# Patient Record
Sex: MALE | Race: WHITE | Employment: OTHER | URBAN - METROPOLITAN AREA
[De-identification: names, ages, dates, MRNs, and addresses within clinical notes are randomized per-mention and may not be internally consistent; named-entity substitution may affect disease eponyms.]

---

## 2017-04-20 ENCOUNTER — OFFICE VISIT (OUTPATIENT)
Dept: DERMATOLOGY | Age: 82
End: 2017-04-20

## 2017-04-20 DIAGNOSIS — L57.0 AK (ACTINIC KERATOSIS): Primary | ICD-10-CM

## 2017-04-20 PROCEDURE — 1036F TOBACCO NON-USER: CPT | Performed by: DERMATOLOGY

## 2017-04-20 PROCEDURE — 17000 DESTRUCT PREMALG LESION: CPT | Performed by: DERMATOLOGY

## 2017-04-20 PROCEDURE — 17003 DESTRUCT PREMALG LES 2-14: CPT | Performed by: DERMATOLOGY

## 2017-07-10 ENCOUNTER — TELEPHONE (OUTPATIENT)
Dept: DERMATOLOGY | Age: 82
End: 2017-07-10

## 2017-07-19 ENCOUNTER — OFFICE VISIT (OUTPATIENT)
Dept: DERMATOLOGY | Age: 82
End: 2017-07-19

## 2017-07-19 DIAGNOSIS — D69.2 SOLAR PURPURA (HCC): Primary | ICD-10-CM

## 2017-07-19 DIAGNOSIS — L57.0 AK (ACTINIC KERATOSIS): ICD-10-CM

## 2017-07-19 PROCEDURE — 1123F ACP DISCUSS/DSCN MKR DOCD: CPT | Performed by: DERMATOLOGY

## 2017-07-19 PROCEDURE — 17003 DESTRUCT PREMALG LES 2-14: CPT | Performed by: DERMATOLOGY

## 2017-07-19 PROCEDURE — 17000 DESTRUCT PREMALG LESION: CPT | Performed by: DERMATOLOGY

## 2017-07-19 PROCEDURE — 4040F PNEUMOC VAC/ADMIN/RCVD: CPT | Performed by: DERMATOLOGY

## 2017-07-19 PROCEDURE — 99212 OFFICE O/P EST SF 10 MIN: CPT | Performed by: DERMATOLOGY

## 2017-07-19 PROCEDURE — 1036F TOBACCO NON-USER: CPT | Performed by: DERMATOLOGY

## 2017-07-19 PROCEDURE — G8427 DOCREV CUR MEDS BY ELIG CLIN: HCPCS | Performed by: DERMATOLOGY

## 2017-07-19 PROCEDURE — G8421 BMI NOT CALCULATED: HCPCS | Performed by: DERMATOLOGY

## 2017-10-23 ENCOUNTER — OFFICE VISIT (OUTPATIENT)
Dept: DERMATOLOGY | Age: 82
End: 2017-10-23

## 2017-10-23 DIAGNOSIS — L57.0 AK (ACTINIC KERATOSIS): Primary | ICD-10-CM

## 2017-10-23 PROCEDURE — 17003 DESTRUCT PREMALG LES 2-14: CPT | Performed by: DERMATOLOGY

## 2017-10-23 PROCEDURE — 17000 DESTRUCT PREMALG LESION: CPT | Performed by: DERMATOLOGY

## 2017-10-23 NOTE — PROGRESS NOTES
[facility]  Lisa Quezada MD  [phone]      Irina Junior  1/1/1929    80 y.o. male     Date of Visit: 10/23/2017    Chief Complaint: skin lesions    History of Present Illness:    1. F/u for a history of AKs - complains of scaly lesions on the ears and face today. 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 Review of Systems:  Skin: No new or changing moles. Past Medical History, Family History, Surgical History, Medications and Allergies reviewed. Past Medical History:   Diagnosis Date    Arthritis     Hyperlipidemia     Psoriasis      Past Surgical History:   Procedure Laterality Date    CARDIAC SURGERY      Open heart surgery    CARPAL TUNNEL RELEASE      bilat    CYST REMOVAL      right cheek    KNEE SURGERY      right knee    MOHS SURGERY      nose       No Known Allergies  Outpatient Prescriptions Marked as Taking for the 10/23/17 encounter (Office Visit) with Angel Ly MD   Medication Sig Dispense Refill    DULoxetine (CYMBALTA) 30 MG extended release capsule       venlafaxine (EFFEXOR) 75 MG tablet Take 1 tablet by mouth 2 times daily. 180 tablet 3    acetaminophen (TYLENOL) 500 MG tablet Take 500 mg by mouth every 6 hours as needed.  simvastatin (ZOCOR) 20 MG tablet Take 40 mg by mouth nightly.  aspirin 81 MG tablet Take 81 mg by mouth daily. Physical Examination       The following were examined and determined to be normal: Psych/Neuro, Conjunctivae/eyelids, Gums/teeth/lips, Neck, Breast/axilla/chest, Abdomen and Back. The following were examined and determined to be abnormal: Scalp/hair, Head/face, RUE and LUE. Well appearing. 1.  R superior helix - 1, tragus of the R ear - 1, R temple - 1, left lower forehead - 1, left temple - 2, left helix - 3, left preauricular cheek - 1: ill defined irreg shaped keratotic pink macules.      Hands and forearms - few ill defined irreg shaped keratotic pink macules. Assessment and Plan     1. AK (actinic keratosis) -     2 cycles of liquid nitrogen applied to 10 AKs: R superior helix - 1, tragus of the R ear - 1, R temple - 1, left lower forehead - 1, left temple - 2, left helix - 3, left preauricular cheek - 1. Patient was educated regarding the potential risks of blister formation, discomfort, hypopigmentation, and scar. Wound care was discussed. Return in about 6 months (around 4/23/2018).

## 2018-04-23 ENCOUNTER — OFFICE VISIT (OUTPATIENT)
Dept: DERMATOLOGY | Age: 83
End: 2018-04-23

## 2018-04-23 DIAGNOSIS — L57.0 AK (ACTINIC KERATOSIS): Primary | ICD-10-CM

## 2018-04-23 PROCEDURE — 17003 DESTRUCT PREMALG LES 2-14: CPT | Performed by: DERMATOLOGY

## 2018-04-23 PROCEDURE — 17000 DESTRUCT PREMALG LESION: CPT | Performed by: DERMATOLOGY

## 2018-10-23 ENCOUNTER — OFFICE VISIT (OUTPATIENT)
Dept: DERMATOLOGY | Age: 83
End: 2018-10-23
Payer: MEDICARE

## 2018-10-23 DIAGNOSIS — L57.0 AK (ACTINIC KERATOSIS): Primary | ICD-10-CM

## 2018-10-23 PROCEDURE — 17000 DESTRUCT PREMALG LESION: CPT | Performed by: DERMATOLOGY

## 2018-10-23 PROCEDURE — 17003 DESTRUCT PREMALG LES 2-14: CPT | Performed by: DERMATOLOGY

## 2019-04-04 ENCOUNTER — TELEPHONE (OUTPATIENT)
Dept: DERMATOLOGY | Age: 84
End: 2019-04-04

## 2019-04-04 NOTE — TELEPHONE ENCOUNTER
Pt wife Aida Sher [phone]  Pt wife Ulysses Delcid states:   - new spot on left forearm   - 3 days   - when it first appeared it was opened now has a scab     - wants to be seen today  Please call to discuss thanks

## 2019-04-05 ENCOUNTER — PROCEDURE VISIT (OUTPATIENT)
Dept: DERMATOLOGY | Age: 84
End: 2019-04-05
Payer: MEDICARE

## 2019-04-05 DIAGNOSIS — L98.9 SKIN LESION OF LEFT ARM: Primary | ICD-10-CM

## 2019-04-05 DIAGNOSIS — L57.0 ACTINIC KERATOSIS: ICD-10-CM

## 2019-04-05 PROCEDURE — 17000 DESTRUCT PREMALG LESION: CPT | Performed by: DERMATOLOGY

## 2019-04-05 PROCEDURE — 99212 OFFICE O/P EST SF 10 MIN: CPT | Performed by: DERMATOLOGY

## 2019-04-05 PROCEDURE — G8427 DOCREV CUR MEDS BY ELIG CLIN: HCPCS | Performed by: DERMATOLOGY

## 2019-04-05 PROCEDURE — 1123F ACP DISCUSS/DSCN MKR DOCD: CPT | Performed by: DERMATOLOGY

## 2019-04-05 PROCEDURE — G8421 BMI NOT CALCULATED: HCPCS | Performed by: DERMATOLOGY

## 2019-04-05 PROCEDURE — 4040F PNEUMOC VAC/ADMIN/RCVD: CPT | Performed by: DERMATOLOGY

## 2019-04-05 PROCEDURE — 17003 DESTRUCT PREMALG LES 2-14: CPT | Performed by: DERMATOLOGY

## 2019-04-05 PROCEDURE — 1036F TOBACCO NON-USER: CPT | Performed by: DERMATOLOGY

## 2019-04-05 NOTE — PROGRESS NOTES
1. Skin lesion of left arm - likely excoriation    Cover with bandage until healed. 2. Actinic keratoses - 3     2 cycles of liquid nitrogen applied to 3 AKs: 2 on the left forearm and 1 on the right lower forehead. Patient was educated regarding the potential risks of blister formation, discomfort, hypopigmentation, and scar. Wound care was discussed.

## 2019-04-23 ENCOUNTER — OFFICE VISIT (OUTPATIENT)
Dept: DERMATOLOGY | Age: 84
End: 2019-04-23
Payer: MEDICARE

## 2019-04-23 DIAGNOSIS — L57.0 ACTINIC KERATOSIS: Primary | ICD-10-CM

## 2019-04-23 DIAGNOSIS — C44.529 SCC (SQUAMOUS CELL CARCINOMA), TRUNK: ICD-10-CM

## 2019-04-23 PROCEDURE — 17261 DSTRJ MAL LES T/A/L .6-1.0CM: CPT | Performed by: DERMATOLOGY

## 2019-04-23 PROCEDURE — 17003 DESTRUCT PREMALG LES 2-14: CPT | Performed by: DERMATOLOGY

## 2019-04-23 PROCEDURE — 17000 DESTRUCT PREMALG LESION: CPT | Performed by: DERMATOLOGY

## 2019-04-23 PROCEDURE — 99999 PR OFFICE/OUTPT VISIT,PROCEDURE ONLY: CPT | Performed by: DERMATOLOGY

## 2019-04-23 NOTE — PROGRESS NOTES
Gums/teeth/lips, Neck, Breast/axilla/chest, Abdomen and RUE. The following were examined and determined to be abnormal: Head/face, Back and LUE. Well appearing. 1.  Right antihelix - 2, left forehead - 1, left forearm - 2, left hand - 1: ill defined irreg shaped keratotic pink macules. 2.  Left upper back - 8 mm dome shaped keratotic pink nodule. Assessment and Plan     1. Actinic keratoses  - several new lesions today    2 cycles of liquid nitrogen applied to 6 AKs: Right antihelix - 2, left forehead - 1, left forearm - 2, left hand - 1. Patient was educated regarding the potential risks of blister formation, discomfort, hypopigmentation, and scar. Wound care was discussed. 2. SCC (squamous cell carcinoma), left upper back - 8 mm    Discussed likely diagnosis; patient agreeable to biopsy and curettage (verbal consent obtained). The area(s) to be biopsied were marked with a surgical pen. Alcohol was used to cleanse the site. Local anesthesia was acheived with 1% lidocaine with epinephrine. Shave biopsy was performed using a razor blade followed by sharp curettage in multiple directions. Hemostasis was achieved with aluminum chloride. The wound(s) were dressed with petrolatum and covered with a bandage. Wound care instructions were reviewed. 1 Specimen (s) sent to pathology. The specimen bottles were appropriately labeled. We also reviewed the risks of bleeding, scar, and infection. Return in about 6 months (around 10/23/2019).

## 2019-04-25 LAB — DERMATOLOGY PATHOLOGY REPORT: ABNORMAL

## 2019-07-19 ENCOUNTER — TELEPHONE (OUTPATIENT)
Dept: DERMATOLOGY | Age: 84
End: 2019-07-19

## 2019-07-25 ENCOUNTER — OFFICE VISIT (OUTPATIENT)
Dept: DERMATOLOGY | Age: 84
End: 2019-07-25
Payer: MEDICARE

## 2019-07-25 DIAGNOSIS — L82.0 INFLAMED SEBORRHEIC KERATOSIS: ICD-10-CM

## 2019-07-25 DIAGNOSIS — L57.0 ACTINIC KERATOSIS: Primary | ICD-10-CM

## 2019-07-25 PROCEDURE — 17000 DESTRUCT PREMALG LESION: CPT | Performed by: DERMATOLOGY

## 2019-07-25 PROCEDURE — 17003 DESTRUCT PREMALG LES 2-14: CPT | Performed by: DERMATOLOGY

## 2019-07-25 PROCEDURE — 17110 DESTRUCTION B9 LES UP TO 14: CPT | Performed by: DERMATOLOGY

## 2019-07-25 NOTE — PROGRESS NOTES
mouth daily. Physical Examination       Well appearing. 1.  Right tragus - 1, right lower forehead - 1, left temple - 1, left lateral cheek - 3: ill defined irreg shaped keratotic pink macules. 2.  Left upper back - 3 crusted erythematous papules and plaques. Assessment and Plan     1. Actinic keratosis     2 cycles of liquid nitrogen applied to 6 AKs: Right tragus - 1, right lower forehead - 1, left temple - 1, left lateral cheek - 3. Patient was educated regarding the potential risks of blister formation, discomfort, hypopigmentation, and scar. Wound care was discussed. 2. Inflamed seborrheic keratosis     2 cycles of liquid nitrogen applied to 3 ISKs on the left upper back. Patient was educated regarding the potential risks of blister formation, discomfort, hypopigmentation, and scar. Wound care was discussed.

## 2019-10-24 ENCOUNTER — OFFICE VISIT (OUTPATIENT)
Dept: DERMATOLOGY | Age: 84
End: 2019-10-24
Payer: MEDICARE

## 2019-10-24 DIAGNOSIS — L57.0 ACTINIC KERATOSIS: Primary | ICD-10-CM

## 2019-10-24 PROCEDURE — 17003 DESTRUCT PREMALG LES 2-14: CPT | Performed by: DERMATOLOGY

## 2019-10-24 PROCEDURE — 17000 DESTRUCT PREMALG LESION: CPT | Performed by: DERMATOLOGY

## 2020-03-02 ENCOUNTER — TELEPHONE (OUTPATIENT)
Dept: DERMATOLOGY | Age: 85
End: 2020-03-02

## 2020-03-09 ENCOUNTER — OFFICE VISIT (OUTPATIENT)
Dept: DERMATOLOGY | Age: 85
End: 2020-03-09
Payer: MEDICARE

## 2020-03-09 PROCEDURE — 17003 DESTRUCT PREMALG LES 2-14: CPT | Performed by: DERMATOLOGY

## 2020-03-09 PROCEDURE — 17000 DESTRUCT PREMALG LESION: CPT | Performed by: DERMATOLOGY

## 2020-03-09 PROCEDURE — 10060 I&D ABSCESS SIMPLE/SINGLE: CPT | Performed by: DERMATOLOGY

## 2020-03-09 NOTE — PATIENT INSTRUCTIONS
Surgical Wound Care Instructions     After your surgery, go home and take it easy. Please refrain from any vigorous physical activity or heavy lifting.  Leave the pressure bandage in place for 48 hours. If it starts to detach, reinforce the bandage with another piece of tape.  Please keep the bandage dry for 48 hours.  After 48 hours, the wound can get wet and place a new bandage daily. Call your doctor if you have any of these signs of infection:    · Skin around the wound is more red, swollen or feels hot. · Wound smells bad    · Pus drainage    · Fever     · Increasing pain      Complications:   If bleeding develops when you go home, apply pressure for 15 minutes continuously. A small amount of ice in a bag covered with a towel may be used for another 10 minutes if necessary. If the bleeding does not stop, please call your dermatologist.   Please call the office if you develop any fevers, chills or pus drains from the wound.  A small amount of redness at the site of the surgery is normal at first, but if the redness begins to spread and/or pain worsens, you may have an infection and need to call the office. Cryosurgery (Freezing) Wound Care Instructions    AFTER THE PROCEDURE:    You will notice swelling and redness around the site. This is normal.    You may experience a sharp or sore feeling for the next several days. For this discomfort, you may take acetaminophen (Tylenol©).  A blister may develop at the treated area, sometimes as soon as by the end of the day. After several days, the blister will subside and a scab will form.  If the area is bumped or traumatized during the first few days following freezing, you may develop bleeding into the blister, forming a blood blister. This is nothing to be alarmed about.    If the blister is tense, uncomfortable, or much larger than the site that was frozen, you may pop the blister along its edge with a sterile needle (ash

## 2020-03-23 ENCOUNTER — VIRTUAL VISIT (OUTPATIENT)
Dept: DERMATOLOGY | Age: 85
End: 2020-03-23

## 2020-03-23 PROCEDURE — G2012 BRIEF CHECK IN BY MD/QHP: HCPCS | Performed by: DERMATOLOGY

## 2020-03-23 NOTE — PROGRESS NOTES
mouth daily. Physical Examination     Images were reviewed that were submitted by the patient. There is a close up image of a brightly erythematous nodule with central incision. There is no erythema surrounding the nodule. Assessment and Plan     1. Skin lesion of back - does not appear larger than 2 weeks ago. Still favor inflamed cyst but could be very early SCC or KA    Discussed with patient's wife. Will monitor for now given COVID-19 outbreak. Can consider having him come to the office if it enlarges or becomes painful over the next couple of weeks. Otherwise, I want to see him back in the office in another month or 2, whenever restrictions are lifted. This visit occurred via telemedicine and a total of 5 minutes was spent discussing the diagnosis and management above.

## 2020-04-03 ENCOUNTER — OFFICE VISIT (OUTPATIENT)
Dept: DERMATOLOGY | Age: 85
End: 2020-04-03
Payer: MEDICARE

## 2020-04-03 PROCEDURE — 17262 DSTRJ MAL LES T/A/L 1.1-2.0: CPT | Performed by: DERMATOLOGY

## 2020-04-03 NOTE — PATIENT INSTRUCTIONS

## 2020-04-03 NOTE — PROGRESS NOTES
[facility]  Shanae Blackwell MD  [phone]      Vanessa   1/1/1929    80 y.o. male     Date of Visit: 4/3/2020    Chief Complaint: skin lesion    History of Present Illness:    He presents today for an enlarging painful lesion on the left upper back. On Eliquis. Review of Systems:  Gen: Feels well, good sense of health. Past Medical History, Family History, Surgical History, Medications and Allergies reviewed. Past Medical History:   Diagnosis Date    Arthritis     Hyperlipidemia     Psoriasis      Past Surgical History:   Procedure Laterality Date    CARDIAC SURGERY      Open heart surgery    CARPAL TUNNEL RELEASE      bilat    CYST REMOVAL      right cheek    KNEE SURGERY      right knee    MOHS SURGERY      nose       No Known Allergies  Outpatient Medications Marked as Taking for the 4/3/20 encounter (Office Visit) with Aftab Sierra MD   Medication Sig Dispense Refill    apixaban (ELIQUIS) 5 MG TABS tablet TAKE ONE TABLET BY MOUTH TWICE A DAY      DULoxetine (CYMBALTA) 30 MG extended release capsule       ketoconazole (NIZORAL) 2 % cream Apply to affected areas on the face twice daily if needed. 30 g 2    nystatin (MYCOSTATIN) cream Apply topically 2 times daily until improved. 30 g 0    venlafaxine (EFFEXOR) 75 MG tablet Take 1 tablet by mouth 2 times daily. 180 tablet 3    acetaminophen (TYLENOL) 500 MG tablet Take 500 mg by mouth every 6 hours as needed.  NAPROXEN PO Take  by mouth.  triamcinolone (KENALOG) 0.1 % cream Apply topically 2 times daily to affected areas as needed until improved. 80 g 1    simvastatin (ZOCOR) 20 MG tablet Take 40 mg by mouth nightly.  aspirin 81 MG tablet Take 81 mg by mouth daily. Physical Examination       Well appearing. Left upper back - 1.8 cm dome shaped centrally keratotic pink nodule. Assessment and Plan     1.  Squamous cell carcinoma of the left upper back - 1.8

## 2020-04-07 LAB — DERMATOLOGY PATHOLOGY REPORT: ABNORMAL

## 2020-07-07 ENCOUNTER — TELEPHONE (OUTPATIENT)
Dept: DERMATOLOGY | Age: 85
End: 2020-07-07

## 2020-07-07 NOTE — TELEPHONE ENCOUNTER
Dr. Phuc Rhodes patient    Patient called and has a cyst removed in March. It is not healing and would like to get in soon. Thanks!     Call back # [phone]

## 2020-07-08 ENCOUNTER — OFFICE VISIT (OUTPATIENT)
Dept: DERMATOLOGY | Age: 85
End: 2020-07-08
Payer: MEDICARE

## 2020-07-08 VITALS — TEMPERATURE: 97.5 F

## 2020-07-08 PROCEDURE — 99212 OFFICE O/P EST SF 10 MIN: CPT | Performed by: DERMATOLOGY

## 2020-07-08 PROCEDURE — G8427 DOCREV CUR MEDS BY ELIG CLIN: HCPCS | Performed by: DERMATOLOGY

## 2020-07-08 PROCEDURE — G8421 BMI NOT CALCULATED: HCPCS | Performed by: DERMATOLOGY

## 2020-07-08 PROCEDURE — 1123F ACP DISCUSS/DSCN MKR DOCD: CPT | Performed by: DERMATOLOGY

## 2020-07-08 PROCEDURE — 4040F PNEUMOC VAC/ADMIN/RCVD: CPT | Performed by: DERMATOLOGY

## 2020-07-08 PROCEDURE — 1036F TOBACCO NON-USER: CPT | Performed by: DERMATOLOGY

## 2020-07-08 PROCEDURE — 11102 TANGNTL BX SKIN SINGLE LES: CPT | Performed by: DERMATOLOGY

## 2020-07-08 NOTE — PROGRESS NOTES
[facility]  Minna Joe MD  [phone]      Julia Delaney  1/1/1929    80 y.o. male     Date of Visit: 7/8/2020    Chief Complaint: f/u for [facility]    History of Present Illness:    1. F/u for a hx of SCC on the left upper back -it was treated with electrodesiccation and curettage at the time of biopsy during the pandemic. However he complains that it has never completely healed. RESULTS   DIAGNOSIS   DIAGNOSIS:   Left upper back-   Squamous cell carcinoma, moderately differentiated   There are atypical keratinocytes with moderate pleomorphism   proliferating within the epithelium.  These cells are   extending into the underlying dermis as irregularly shaped   islands. Kole Reid MD     2. Unknown duration of a persistent asymptomatic lesion on the left forearm. Review of Systems:  Skin: No new or changing moles. Past Medical History, Family History, Surgical History, Medications and Allergies reviewed. Past Medical History:   Diagnosis Date    Arthritis     Hyperlipidemia     Psoriasis      Past Surgical History:   Procedure Laterality Date    CARDIAC SURGERY      Open heart surgery    CARPAL TUNNEL RELEASE      bilat    CYST REMOVAL      right cheek    KNEE SURGERY      right knee    MOHS SURGERY      nose       No Known Allergies  Outpatient Medications Marked as Taking for the 7/8/20 encounter (Office Visit) with Gabriela Aiken MD   Medication Sig Dispense Refill    DULoxetine (CYMBALTA) 30 MG extended release capsule       ketoconazole (NIZORAL) 2 % cream Apply to affected areas on the face twice daily if needed. 30 g 2    nystatin (MYCOSTATIN) cream Apply topically 2 times daily until improved. 30 g 0    acetaminophen (TYLENOL) 500 MG tablet Take 500 mg by mouth every 6 hours as needed.  NAPROXEN PO Take  by mouth.       triamcinolone (KENALOG) 0.1 % cream Apply topically 2 times daily to affected areas as needed

## 2020-07-08 NOTE — PATIENT INSTRUCTIONS

## 2020-08-04 ENCOUNTER — PROCEDURE VISIT (OUTPATIENT)
Dept: SURGERY | Age: 85
End: 2020-08-04
Payer: MEDICARE

## 2020-08-04 ENCOUNTER — TELEPHONE (OUTPATIENT)
Dept: SURGERY | Age: 85
End: 2020-08-04

## 2020-08-04 VITALS — TEMPERATURE: 97.2 F

## 2020-08-04 PROBLEM — C44.529: Status: ACTIVE | Noted: 2020-08-04

## 2020-08-04 PROCEDURE — 17314 MOHS ADDL STAGE T/A/L: CPT | Performed by: DERMATOLOGY

## 2020-08-04 PROCEDURE — 17313 MOHS 1 STAGE T/A/L: CPT | Performed by: DERMATOLOGY

## 2020-08-04 PROCEDURE — 13101 CMPLX RPR TRUNK 2.6-7.5 CM: CPT | Performed by: DERMATOLOGY

## 2020-08-04 PROCEDURE — 13102 CMPLX RPR TRUNK ADDL 5CM/<: CPT | Performed by: DERMATOLOGY

## 2020-08-04 RX ORDER — CEPHALEXIN 500 MG/1
500 CAPSULE ORAL 3 TIMES DAILY
Qty: 21 CAPSULE | Refills: 0 | Status: SHIPPED | OUTPATIENT
Start: 2020-08-04

## 2020-08-04 NOTE — PROGRESS NOTES
The patient was counseled at length about the risks of tim Covid-19 during their perioperative period and any recovery window from their procedure. The patient was made aware that tim Covid-19  may worsen their prognosis for recovering from their procedure  and lend to a higher morbidity and/or mortality risk. All material risks, benefits, and reasonable alternatives including postponing the procedure were discussed. The patient does wish to proceed with the procedure at this time. MOHS PROCEDURE NOTE    PHYSICIAN:  Kimberly Berman. Carollynn Epley, MD    ASSISTANT: Ivonne Escalera RN     REFERRING PROVIDER:   Ramiro Estrada MD    PREOPERATIVE DIAGNOSIS: Invasive moderately-differentiated Squamous Cell Carcinoma     SPECIFIC MOHS INDICATIONS:  size    AUC SCORIN/9    POSTOPERATIVE DIAGNOSIS: SAME    LOCATION: Left upper back    OPERATIVE PROCEDURE:  MOHS MICROGRAPHIC SURGERY    RECONSTRUCTION OF DEFECT: Complex layered closure    PREOPERATIVE SIZE: 50x40 MM    DEFECT SIZE: 60x47 MM    LENGTH OF REPAIRED WOUND/SIZE OF FLAP/SIZE OF GRAFT:  105 MM    ANESTHESIA:  45mL 1% lidocaine with epinephrine 1:100,000 buffered. EBL:  MINIMAL    DURATION OF PROCEDURE:  2 HOURS 45 MINUTES    POSTOPERATIVE OBSERVATION: 1 HOUR    SPECIMENS:  SEE MOHS MAP    COMPLICATIONS:  NONE    DESCRIPTION OF PROCEDURE:  The patient was given a mirror, as appropriate, and the biopsy site was identified, marked with a surgical marking pen, and verified by the patient. Options for treatment were discussed and the patient was informed that Mohs surgery was the selected treatment based on its lower recurrence rate, given the features listed above, as compared to other treatment modalities such as excision, radiation, or curettage, and agreed with this treatment plan.   Risks and benefits including bruising, swelling, bleeding, infection, nerve injury, recurrence, and scarring were discussed with the patient prior to the procedure and a written consent detailing these and other risks was reviewed with the patient and signed. There was a time out for person and procedure verification. The surgical site was prepped with an antiseptic solution. Application of an antiseptic solution was repeated before each surgical stage. Stage I:  The clinically-apparent tumor was carefully defined and debulked, determining the edge of the surgical excision. A thin layer of tumor-laden tissue was excised with a narrow margin of normal-appearing skin, using the technique of Mohs. A map was prepared to correspond to the area of skin from which it was excised. Hemostasis was achieved using electrosurgery. The wound was bandaged. The tissue was prepared for the cryostat and sectioned. 2 section(s) prepared. Each section was coded, cut, and stained for microscopic examination. The entire base and margins of the excised piece of tissue were examined by the surgeon. The specimen depth was to subcutaneous fat. Stage I:  Moderately differentiated: infiltrating sheets of squamous epithelium. Structural disorganization in which squamous epithelial derivation is less obvious and less evident keratin formation limited to horn cysts. The remaining tumor was noted and the next stage was performed. Stage II:  A thin layer of tissue was removed at the histologically-identified sites of remaining tumor. The entire procedure as described in stage I was repeated to process the tissue according to Mohs technique. 1 section(s) prepared for stage II. No tumor was identified at the peripheral margins of stage II of microscopically controlled surgery. DEFECT MANAGEMENT:    REPAIR DESCRIPTION:  Various closure modalities were discussed with the patient, and it was decided that a complex repair would best preserve normal anatomic and functional relationships. Additional risk of wound dehiscence was discussed.      This is a complex closure based on: Undermining    Extensive undermining was performed: the distance of undermining was 50mm, which is equal to or greater than the maximum width of the defect, measured perpendicular to the closure line along at least one entire edge of the defect. The patient was placed on the procedure room table. The area was anesthetized with 1% lidocaine with epinephrine 1:100,000 buffered, was given a sterile prep using Chlorhexidine gluconate 4% solution, and draped in the usual sterile fashion. Recreation and enlargement of the wound was performed by excising cones of tissue via the triangulation technique. The final incision lines were placed with respect for the patient's natural skin tension lines in a linear configuration to avoid functional and aesthetic distortion of adjacent free margins. Following undermining, meticulous hemostasis was obtained with spot monopolar electrocoagulation. Subcutaneous dead space and dermis were closed using 3-0 Vicryl buried subcutaneous interrupted suture and the epidermis was approximated with 4-0 Prolene using simple interrupted stitches. WOUND COVERAGE:  The wound was cleaned with normal saline solution, dried off, Aquaphor ointment was applied, and the wound was covered. A dressing was applied for stabilization and light pressure. The patient was given detailed oral and written instructions on postoperative care. There were no complications. The patient left the Unit in good medical condition. FOLLOW-UP:  The patient will return for suture removal in 14 days.

## 2020-08-04 NOTE — PROGRESS NOTES
PRE-PROCEDURE SCREENING    Pacemaker/ICD: No  Difficulty with numbing in the past: No  Local Anesthesia Reaction/passing out: No  Latex or adhesive allergy:  No  Bleeding/Clotting Disorders: No  Anticoagulant Therapy: Yes  Joint prosthesis: Yes, R and L knee  Artificial Heart Valve: No  Stroke or Seizures: No  Organ Transplant or Lymphoma: No  Immunosuppression: No  Respiratory Problems: No

## 2020-08-04 NOTE — PATIENT INSTRUCTIONS
[facility] Office Hours:    Monday-Thursday  7:30 AM-4:30 PM    Friday  9:00 AM-1:00 PM      POST-OPERATIVE CARE FOR STICHES  Bandage change after 48 hours    CARING FOR YOUR SURGICAL SITE  The bandage should remain on and completely dry for 48 hours. Do NOT get the bandage wet. 1. After the first 48 hours, gently remove the remaining part of the bandage. It can be helpful to moisten the bandage edges in the shower. Steri strips may still be on the wound. It is ok, they will fall off slowly with the daily bandage changes. 2. Gently clean the wound daily with mild soap and water. Try to clean off crust and debris. 3. Dry (pat) the area with a clean Q-tip or gauze. 4. Apply a layer of Vaseline/ Aquaphor (or Bacitracin if your doctor recommends) to the wound area only. 5. Cut a piece of Telfa (or any non-stick dressing) to fit just over the wound and secure it with paper tape. If the wound is small you may use a Band- Aid. Keep area covered for a total of 2 week(s). If the dressing comes off or if you have questions, or concerns about the dressing, please call the office for instructions! POST OPERATIVE INSTRUCTIONS    1. Activity: Do not lift anything heavier than a gallon of milk for 1 week. Also, avoid strenuous activity such as running, power walking or contact sports. 2. Eating and drinking: Do not drink alcohol for 48 hours after your procedure. Alcohol increases the chances of bleeding. 3. Medicines   -If you have discomfort, take Acetaminophen (Tylenol or Extra Strength Tylenol). Follow the instructions and warning on the bottle. -If your doctor has prescribed you an Aspirin daily, please keep taking it. Do not take extra Aspirin or medicines containing Aspirin (such as Celena[place] and Excedrin) for 48 hours after your procedure. Bleeding: If bleeding occurs, DO NOT remove the bandage. Put firm pressure on the area with gauze for 20 minutes without peeking.  If the bleeding continues, apply pressure for another 20 minutes. If the bleeding does not stop after you apply pressure, call us right away. If you can not call, go to the nearest emergency room or urgent care facility. What to expect:  You may have these symptoms. They are normal and should get better with time:  1. Swelling. Swelling usually increases for the first 48 hours after your procedure and then begins to improve. Some soreness and redness around your wound. If we worked close to your eyes (forehead, nose, temple, or upper cheeks) your eyes may become swollen and/ or black and blue. 2. Bruising, which could last 1 week or more. 3. Pink and bumpy appearance to the scar. This may happen a few weeks after your procedure. After 4 weeks, you may gently massage the area each day with facial moisturizer or petroleum jelly (Vaseline or Aquaphor). This will help to smooth the skin and improve the appearance of the scar. The color of your scar will fade over time, but it may be pink for several months after the procedure. The scar may take 6 months to 1 year to reach its final color and appearance. 4. \"Spitting\" suture. Occasionally, an inside suture (stitch) does not completely dissolve. When this happens, (generally 4-8 weeks after surgery), it causes a bump or \"pimple\" to form on the scar. This is easily removed and is not at all serious. It does not mean the skin cancer has returned. Contact us if it happens, but do not be alarmed. Vitamin E oil is NOT necessary. A good moisturizer is just as effective. Sunscreen IS necessary. Use at least and SPF 30 sunscreen daily- even in winter    Call us at [phone] right away if you have any of the following symptoms:  -Bleeding that you can not stop (see highlighted area above). -Pain that lasts longer than 48 hours.  -Your wound becomes more painful, red or hot.   -Bruising and swelling that does not begin to improve within the 48 hours or gets worse suddenly.

## 2020-08-05 ENCOUNTER — TELEPHONE (OUTPATIENT)
Dept: SURGERY | Age: 85
End: 2020-08-05

## 2020-08-13 ENCOUNTER — TELEPHONE (OUTPATIENT)
Dept: DERMATOLOGY | Age: 85
End: 2020-08-13

## 2020-08-13 RX ORDER — FLUOROURACIL 50 MG/G
CREAM TOPICAL
Qty: 40 G | Refills: 0 | Status: SHIPPED | OUTPATIENT
Start: 2020-08-13

## 2020-08-18 ENCOUNTER — OFFICE VISIT (OUTPATIENT)
Dept: SURGERY | Age: 85
End: 2020-08-18

## 2020-08-18 ENCOUNTER — TELEPHONE (OUTPATIENT)
Dept: SURGERY | Age: 85
End: 2020-08-18

## 2020-08-18 PROCEDURE — 99024 POSTOP FOLLOW-UP VISIT: CPT | Performed by: DERMATOLOGY

## 2020-08-18 NOTE — PATIENT INSTRUCTIONS
[facility] Office Hours:    Monday-Thursday  7:30 AM-4:30 PM    Friday  9:00 AM-1:00 PM    WOUND CARE AFTER SUTURE REMOVAL    After your stiches have been removed, your scar is still very fragile. In fact, scars continue to change and evolve, what we call remodel, for about a year after your procedure. Follow the following steps below to ensure that your scar heals well. Instructions    1. If Steri-strips were applied, keep them on until they fall off on their own. 2. Protect your scar from the sun. Use a sunscreen or bandage to cover your scar. [facility] exposure can cause your scar to become discolored and appear red or brown. 3. To help soften your scar more rapidly, it is helpful, but not necessary, for you to   massage the scar gently each night for twenty minutes. 4. Spitting suture. Occasionally, an inside suture (stitch) does not completely dissolve. When this happens, (generally 4-8 weeks after surgery), it causes a bump or pimple to form on the scar. This is easily removed and is not at all serious. It   does not mean the skin cancer has returned. Contact us if it happens, but do not be alarmed. 5. If you scar becomes tender, itchy or becomes very large, let Dr. Rayleen Gaucher know. There    are treatments that can improve the appearance of your scar or help make it more comfortable.

## 2020-08-20 NOTE — PROGRESS NOTES
S:  The patient is here for suture removal s/p Mohs surgery on the left upper back and Complex layered closure repair, 2 week(s) ago. The site appears well-healed without signs of infection (redness, pain or discharge). The sutures were removed. Incision looks great. Wound care and activity instructions given. The patient was scheduled for follow-up as needed for scar/wound check. The patient was scheduled for f/u with General Dermatology per their instructions.

## 2020-09-29 ENCOUNTER — OFFICE VISIT (OUTPATIENT)
Dept: DERMATOLOGY | Age: 85
End: 2020-09-29
Payer: MEDICARE

## 2020-09-29 VITALS — TEMPERATURE: 97.3 F

## 2020-09-29 PROCEDURE — G8427 DOCREV CUR MEDS BY ELIG CLIN: HCPCS | Performed by: DERMATOLOGY

## 2020-09-29 PROCEDURE — 1036F TOBACCO NON-USER: CPT | Performed by: DERMATOLOGY

## 2020-09-29 PROCEDURE — 1123F ACP DISCUSS/DSCN MKR DOCD: CPT | Performed by: DERMATOLOGY

## 2020-09-29 PROCEDURE — 99213 OFFICE O/P EST LOW 20 MIN: CPT | Performed by: DERMATOLOGY

## 2020-09-29 PROCEDURE — G8421 BMI NOT CALCULATED: HCPCS | Performed by: DERMATOLOGY

## 2020-09-29 PROCEDURE — 17003 DESTRUCT PREMALG LES 2-14: CPT | Performed by: DERMATOLOGY

## 2020-09-29 PROCEDURE — 4040F PNEUMOC VAC/ADMIN/RCVD: CPT | Performed by: DERMATOLOGY

## 2020-09-29 PROCEDURE — 17000 DESTRUCT PREMALG LESION: CPT | Performed by: DERMATOLOGY

## 2020-09-29 NOTE — PROGRESS NOTES
[facility]  Comfort Grijalva MD  [phone]      Greer Montesinos  1/1/1929    80 y.o. male     Date of Visit: 9/29/2020    Chief Complaint: SCC    History of Present Illness:    1. He complains of few persistent scaly lesions on the face and left forearm. 2.  He complains of a rough lesion on the left upper chest.    3.  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 He reports marked improvement. 4.  He also has a history of multiple nonmelanoma skin cancers, most recently with a large SCC on the left upper back. He denies any signs of recurrence. Large SCC on the left upper back-treated with Mohs by Dr. Perla Montesinos in July 2020. 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     Review of Systems:  Skin: No new or changing moles. Past Medical History, Family History, Surgical History, Medications and Allergies reviewed. Past Medical History:   Diagnosis Date    Arthritis     Hyperlipidemia     Psoriasis      Past Surgical History:   Procedure Laterality Date    CARDIAC SURGERY      Open heart surgery    CARPAL TUNNEL RELEASE      bilat    CYST REMOVAL      right cheek    KNEE SURGERY      right knee    MOHS SURGERY      nose       No Known Allergies  Outpatient Medications Marked as Taking for the 9/29/20 encounter (Office Visit) with Suleiman Roy MD   Medication Sig Dispense Refill    fluorouracil (EFUDEX) 5 % cream Apply topically to the lesion on the left forearm 2 times daily for 4 weeks. 40 g 0    cephALEXin (KEFLEX) 500 MG capsule Take 1 capsule by mouth 3 times daily 21 capsule 0    DULoxetine (CYMBALTA) 30 MG extended release capsule       ketoconazole (NIZORAL) 2 % cream Apply to affected areas on the face twice daily if needed.  30 g 2    nystatin (MYCOSTATIN) cream Apply topically 2 times daily until improved. 30 g 0    venlafaxine (EFFEXOR) 75 MG tablet Take 1 tablet by mouth 2 times daily. 180 tablet 3    acetaminophen (TYLENOL) 500 MG tablet Take 500 mg by mouth every 6 hours as needed.  NAPROXEN PO Take  by mouth.  triamcinolone (KENALOG) 0.1 % cream Apply topically 2 times daily to affected areas as needed until improved. 80 g 1    simvastatin (ZOCOR) 20 MG tablet Take 40 mg by mouth nightly.  aspirin 81 MG tablet Take 81 mg by mouth daily. Physical Examination       The following were examined and determined to be normal: Psych/Neuro, Scalp/hair, Conjunctivae/eyelids, Gums/teeth/lips, Neck, Breast/axilla/chest, Abdomen, Back and RUE. The following were examined and determined to be abnormal: Head/face and LUE. Well appearing. 1.  Left forearm - 2, left upper forehead - 2, right tragus - 1, right superior helix - 1, right lower forehead - 1: ill defined irreg shaped keratotic pink macules. 2.  Left upper chest - stuck on appearing verrucous pink brown papule. 3.  Clear. 4.  Clear. Assessment and Plan     1. AK (actinic keratosis) - several    2 cycles of liquid nitrogen applied to 7 AKs: Left forearm - 2, left upper forehead - 2, right tragus - 1, right superior helix - 1, right lower forehead - 1. Patient was educated regarding the potential risks of blister formation, discomfort, hypopigmentation, and scar. Wound care was discussed. 2. SK (seborrheic keratosis)     Reassurance. 3. Squamous cell cancer of skin of left forearm - clear after use of Efudex    Monitor for recurrence. 4. History of nonmelanoma skin cancers - clear    Sun protective behaviors and self skin examinations were encouraged. Call for any new or concerning lesions. Return in about 6 months (around 3/29/2021).

## 2020-11-24 ENCOUNTER — TELEPHONE (OUTPATIENT)
Dept: DERMATOLOGY | Age: 85
End: 2020-11-24

## 2020-11-24 NOTE — TELEPHONE ENCOUNTER
Patient's wife Barry Isaac has an appointment 12/16 at 10:00 with Dr. Sang López. Nely Good has a suspicious spot on his arm and she is calling to see if Dr could get him in on that day sometime near her appointment. She said Dr Josef Chavez told him if any spots came up to call right away.  Please call her back     [phone]

## 2020-12-01 ENCOUNTER — OFFICE VISIT (OUTPATIENT)
Dept: DERMATOLOGY | Age: 85
End: 2020-12-01
Payer: MEDICARE

## 2020-12-01 VITALS — TEMPERATURE: 96.8 F

## 2020-12-01 PROCEDURE — 17000 DESTRUCT PREMALG LESION: CPT | Performed by: DERMATOLOGY

## 2020-12-01 PROCEDURE — 17003 DESTRUCT PREMALG LES 2-14: CPT | Performed by: DERMATOLOGY

## 2020-12-01 PROCEDURE — 17261 DSTRJ MAL LES T/A/L .6-1.0CM: CPT | Performed by: DERMATOLOGY

## 2020-12-01 NOTE — PATIENT INSTRUCTIONS

## 2020-12-01 NOTE — PROGRESS NOTES
hours as needed.  NAPROXEN PO Take  by mouth.  triamcinolone (KENALOG) 0.1 % cream Apply topically 2 times daily to affected areas as needed until improved. 80 g 1    simvastatin (ZOCOR) 20 MG tablet Take 40 mg by mouth nightly.  aspirin 81 MG tablet Take 81 mg by mouth daily. Physical Examination       The following were examined and determined to be normal: Psych/Neuro, Scalp/hair, Conjunctivae/eyelids, Gums/teeth/lips, Neck, Breast/axilla/chest, Abdomen, Back and LUE. The following were examined and determined to be abnormal: Head/face and RUE. Well appearing. 1.  Right preauricular cheek - 1, right superior temple - 1, right lower forehead - 1, right forearm - 2: ill defined irreg shaped keratotic pink macules. 2.  Right superior shoulder - 1 cm oval shaped crusted and telangiectatic erythematous macule. Assessment and Plan     1. Actinic keratoses - several    2 cycles of liquid nitrogen applied to 5 AKs: Right preauricular cheek - 1, right superior temple - 1, right lower forehead - 1, right forearm - 2. Patient was educated regarding the potential risks of blister formation, discomfort, hypopigmentation, and scar. Wound care was discussed. 2. Basal cell carcinoma (BCC) of right shoulder - 1 cm    Discussed likely diagnosis; patient agreeable to biopsy and curettage (verbal consent obtained). The area(s) to be biopsied were marked with a surgical pen. Alcohol was used to cleanse the site. Local anesthesia was acheived with 1% lidocaine with epinephrine. Shave biopsy was performed using a razor blade followed by sharp curettage in multiple directions. Hemostasis was achieved with aluminum chloride. The wound(s) were dressed with petrolatum and covered with a bandage. Wound care instructions were reviewed. 1 Specimen (s) sent to pathology. The specimen bottles were appropriately labeled.   We also reviewed the risks of bleeding, scar, and infection.         --Rosalba Morales MD

## 2020-12-03 LAB — DERMATOLOGY PATHOLOGY REPORT: ABNORMAL

## 2021-04-06 ENCOUNTER — OFFICE VISIT (OUTPATIENT)
Dept: DERMATOLOGY | Age: 86
End: 2021-04-06
Payer: MEDICARE

## 2021-04-06 VITALS — TEMPERATURE: 97.3 F

## 2021-04-06 DIAGNOSIS — L57.0 ACTINIC KERATOSIS: Primary | ICD-10-CM

## 2021-04-06 DIAGNOSIS — C44.319 BASAL CELL CARCINOMA (BCC) OF CHIN: ICD-10-CM

## 2021-04-06 PROCEDURE — 17280 DSTR MAL LS F/E/E/N/L/M .5/<: CPT | Performed by: DERMATOLOGY

## 2021-04-06 PROCEDURE — 17000 DESTRUCT PREMALG LESION: CPT | Performed by: DERMATOLOGY

## 2021-04-06 PROCEDURE — 17003 DESTRUCT PREMALG LES 2-14: CPT | Performed by: DERMATOLOGY

## 2021-04-06 NOTE — PATIENT INSTRUCTIONS
top of your wound. WOUND CARE:    You may shower or bathe as usual, but avoid scrubbing the areas that have been frozen.  Cleanse the site twice a day with mild soapy water, and then apply a thin film of white petrolatum (Vaseline©).  You do not need to cover the area, but can if you prefer.  Do NOT allow the site to become dry or crusted, or attempt to dry it out with rubbing alcohol or hydrogen peroxide.  Continue this regimen until the area is pink and healed. Depending on the size and location of your cryosurgery site, healing may take 2 to 4 weeks.  The area may continue to be pink for several weeks, and over the next few months may become darker or lighter than the surrounding skin. This may be a permanent change.    

## 2021-04-06 NOTE — PROGRESS NOTES
[facility]  Felix Saleem MD  [phone]      Amando Hernandez  1/1/1929    80 y.o. male     Date of Visit: 4/6/2021    Chief Complaint: skin lesions    History of Present Illness:    1. Follow-up for history of actinic keratoses-has several new lesions today on the face and right upper arm. 2.  He also has an asymptomatic lesion on the right side of the chin. Dermatologic history:  Large SCC on the left upper back-treated with Mohs by Dr. Troy Liao in July 2020. 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   Nodular BCC on the right superior shoulder-treated with curettage on 12/1/2020. Review of Systems:  Gen: Feels well, good sense of health. Past Medical History, Family History, Surgical History, Medications and Allergies reviewed. Past Medical History:   Diagnosis Date    Arthritis     Hyperlipidemia     Psoriasis      Past Surgical History:   Procedure Laterality Date    CARDIAC SURGERY      Open heart surgery    CARPAL TUNNEL RELEASE      bilat    CYST REMOVAL      right cheek    KNEE SURGERY      right knee    MOHS SURGERY      nose       No Known Allergies  Outpatient Medications Marked as Taking for the 4/6/21 encounter (Office Visit) with Fernando Bedolla MD   Medication Sig Dispense Refill    DULoxetine (CYMBALTA) 30 MG extended release capsule       ketoconazole (NIZORAL) 2 % cream Apply to affected areas on the face twice daily if needed. 30 g 2    nystatin (MYCOSTATIN) cream Apply topically 2 times daily until improved. 30 g 0    venlafaxine (EFFEXOR) 75 MG tablet Take 1 tablet by mouth 2 times daily. 180 tablet 3    acetaminophen (TYLENOL) 500 MG tablet Take 500 mg by mouth every 6 hours as needed.  NAPROXEN PO Take  by mouth.  triamcinolone (KENALOG) 0.1 % cream Apply topically 2 times daily to affected areas as needed until improved.  80 g 1    simvastatin (ZOCOR) 20 MG tablet Take 40 mg by

## 2021-04-08 ENCOUNTER — PATIENT MESSAGE (OUTPATIENT)
Dept: DERMATOLOGY | Age: 86
End: 2021-04-08

## 2021-04-08 LAB — DERMATOLOGY PATHOLOGY REPORT: ABNORMAL

## 2021-08-31 ENCOUNTER — PATIENT MESSAGE (OUTPATIENT)
Dept: DERMATOLOGY | Age: 86
End: 2021-08-31

## 2021-08-31 NOTE — TELEPHONE ENCOUNTER
From: Gabriela Forbes  To: Germain Haywood MD  Sent: 8/31/2021 2:39 PM EDT  Subject: Non-Urgent Medical Question    This is from Toribio Dvuall. Thought you would like to know that Gladys Mendez is now in Hospice care. In April he noticed a lump in his left armpit. 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 His cardiologist ordered a stress test and Echocardiogram which would have taken an entire day. By that time he was worn out and said it was enough and opted out of the surgery that had been scheduled with Dr Fer Lentz. He has been steadily going down but his sense of humor is still there. It has been estimated that the mass has been there for 10 years and the eruption on his left shoulder that Dr Isai Ann took off was a part of it. Thought you should know.